# Patient Record
Sex: MALE | Race: WHITE | URBAN - METROPOLITAN AREA
[De-identification: names, ages, dates, MRNs, and addresses within clinical notes are randomized per-mention and may not be internally consistent; named-entity substitution may affect disease eponyms.]

---

## 2023-11-06 ENCOUNTER — DOCUMENTATION (OUTPATIENT)
Dept: AUDIOLOGY | Facility: CLINIC | Age: 73
End: 2023-11-06

## 2023-11-06 NOTE — PROGRESS NOTES
Platial S SMART III WITH A RIGHT #2 STANDARD  AND LARGE POWER DOME FOR A REPLACEMENT. SN: [id number]     Patient came in because hearing aid stopped producing sound. Wax guard and microphone cover were both replaced. Wax was carefully cleaned off aid and microphones were vaccumed. Listening check was good.

## 2024-03-04 ENCOUNTER — APPOINTMENT (OUTPATIENT)
Dept: AUDIOLOGY | Facility: CLINIC | Age: 74
End: 2024-03-04

## 2024-07-30 ENCOUNTER — CLINICAL SUPPORT (OUTPATIENT)
Dept: AUDIOLOGY | Facility: CLINIC | Age: 74
End: 2024-07-30

## 2024-07-30 DIAGNOSIS — H90.3 SENSORINEURAL HEARING LOSS (SNHL) OF BOTH EARS: Primary | ICD-10-CM

## 2024-07-30 NOTE — PROGRESS NOTES
HEARING AID CHECK    RIGHT:PHONAK AUDEO SMART 3 #2  standard  SN:[id number]  LEFT:PHONAK AUDEO SMART 3 #2  standard  SN:[id number]  FIT DATE:  2013  WARRANTY: NONE    This patient was seen for a HAC with the complaint that   aids were not working well and left very static sound.   Otoscopy : clear au    The following programming changes were made: Aids would not connect but did change out  left and will call [employer] for a replacement. Cleaned and checked and made an appointment for new hearing aids in the [place] office.      The patient paid $125.00 for today's visit.  Return next month for HAE sooner if needed.    APPOINTMENT TIME:  30 minutes

## 2024-09-16 ENCOUNTER — APPOINTMENT (OUTPATIENT)
Dept: AUDIOLOGY | Facility: CLINIC | Age: 74
End: 2024-09-16